# Patient Record
Sex: FEMALE | Race: WHITE | Employment: FULL TIME | URBAN - METROPOLITAN AREA
[De-identification: names, ages, dates, MRNs, and addresses within clinical notes are randomized per-mention and may not be internally consistent; named-entity substitution may affect disease eponyms.]

---

## 2017-02-10 PROBLEM — F41.9 ANXIETY: Status: ACTIVE | Noted: 2017-02-10

## 2017-02-10 PROBLEM — F60.5 OBSESSIVE COMPULSIVE PERSONALITY DISORDER (HCC): Status: ACTIVE | Noted: 2017-02-10

## 2017-07-10 PROBLEM — Z13.89 SCREENING FOR CARDIOVASCULAR, RESPIRATORY, AND GENITOURINARY DISEASES: Status: ACTIVE | Noted: 2017-07-10

## 2017-07-10 PROBLEM — Z13.6 SCREENING FOR CARDIOVASCULAR, RESPIRATORY, AND GENITOURINARY DISEASES: Status: ACTIVE | Noted: 2017-07-10

## 2017-07-10 PROBLEM — Z13.83 SCREENING FOR CARDIOVASCULAR, RESPIRATORY, AND GENITOURINARY DISEASES: Status: ACTIVE | Noted: 2017-07-10

## 2019-09-25 PROBLEM — Z13.89 SCREENING FOR CARDIOVASCULAR, RESPIRATORY, AND GENITOURINARY DISEASES: Status: RESOLVED | Noted: 2017-07-10 | Resolved: 2019-09-25

## 2019-09-25 PROBLEM — Z13.83 SCREENING FOR CARDIOVASCULAR, RESPIRATORY, AND GENITOURINARY DISEASES: Status: RESOLVED | Noted: 2017-07-10 | Resolved: 2019-09-25

## 2019-09-25 PROBLEM — Z13.6 SCREENING FOR CARDIOVASCULAR, RESPIRATORY, AND GENITOURINARY DISEASES: Status: RESOLVED | Noted: 2017-07-10 | Resolved: 2019-09-25

## 2021-10-01 ENCOUNTER — TRANSCRIBE ORDER (OUTPATIENT)
Dept: SCHEDULING | Age: 26
End: 2021-10-01

## 2021-10-01 DIAGNOSIS — N60.01 CYST OF RIGHT BREAST: Primary | ICD-10-CM

## 2022-03-18 PROBLEM — F41.9 ANXIETY: Status: ACTIVE | Noted: 2017-02-10

## 2022-03-19 PROBLEM — F60.5 OBSESSIVE COMPULSIVE PERSONALITY DISORDER (HCC): Status: ACTIVE | Noted: 2017-02-10

## 2022-07-19 ENCOUNTER — OFFICE VISIT (OUTPATIENT)
Dept: OCCUPATIONAL MEDICINE | Age: 27
End: 2022-07-19

## 2022-07-19 DIAGNOSIS — N39.0 URINARY TRACT INFECTION WITHOUT HEMATURIA, SITE UNSPECIFIED: Primary | ICD-10-CM

## 2022-07-19 DIAGNOSIS — R30.0 DYSURIA: ICD-10-CM

## 2022-07-19 PROCEDURE — 99213 OFFICE O/P EST LOW 20 MIN: CPT | Performed by: NURSE PRACTITIONER

## 2022-07-19 RX ORDER — FLUCONAZOLE 150 MG/1
TABLET ORAL
Qty: 2 TABLET | Refills: 0 | Status: SHIPPED | OUTPATIENT
Start: 2022-07-19 | End: 2022-09-19 | Stop reason: SDUPTHER

## 2022-07-19 RX ORDER — NITROFURANTOIN 25; 75 MG/1; MG/1
100 CAPSULE ORAL 2 TIMES DAILY
Qty: 14 CAPSULE | Refills: 0 | Status: SHIPPED | OUTPATIENT
Start: 2022-07-19 | End: 2022-09-19 | Stop reason: SDUPTHER

## 2022-07-21 ASSESSMENT — ENCOUNTER SYMPTOMS
NAUSEA: 0
CONSTIPATION: 0
COUGH: 0
ABDOMINAL PAIN: 0

## 2022-07-22 NOTE — PROGRESS NOTES
PROGRESS NOTE    SUBJECTIVE:   Kimberley Ralph is a 23 y.o. female seen for a follow up visit regarding Dysuria   The patient is a 23 y.o. female who is seen for evaluation of dysuria, frequency, urgency, abnormal smelling urine, foul smelling urine. She has had symptoms for several days. Patient also complains of back pain. Patient denies congestion, cough, fever, headache, rhinitis, sorethroat, stomach ache, and vaginal discharge. Patient does not have a history of recurrent UTI. Patient does not have a history of pyelonephritis. Past Medical History, Past Surgical History, Family history, Social History, and Medications were all reviewed with the patient today and updated as necessary. Current Outpatient Medications   Medication Sig Dispense Refill    nitrofurantoin, macrocrystal-monohydrate, (MACROBID) 100 MG capsule Take 1 capsule by mouth in the morning and 1 capsule before bedtime. 14 capsule 0    fluconazole (DIFLUCAN) 150 MG tablet Take one now, repeat in 72 hours 2 tablet 0    fluticasone (FLONASE) 50 MCG/ACT nasal spray 2 sprays by Nasal route daily      Methen-Hyosc-Meth Blue-Na Phos (UROGESIC-BLUE) 81.6 MG TABS Take QID PO PRN for dysuria x 2 days.  norethindrone-ethinyl estradiol (ORTHO-NOVUM 7/7/7) 0.5/0.75/1-35 MG-MCG per tablet Take 1 tablet by mouth daily       No current facility-administered medications for this visit. 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   Family History   Problem Relation Age of Onset    Lung Disease Maternal Grandfather     Diabetes Other         INSIPIDUS     Social History     Tobacco Use    Smoking status: Never    Smokeless tobacco: Never   Substance Use Topics    Alcohol use: Not Currently Alcohol/week: 4.0 standard drinks       Review of Systems   Constitutional:  Negative for fatigue and fever. Respiratory:  Negative for cough. Cardiovascular:  Negative for chest pain. Gastrointestinal:  Negative for abdominal pain, constipation and nausea. Genitourinary:  Positive for dysuria and frequency. Negative for flank pain, hematuria and urgency. Skin:  Negative for rash. OBJECTIVE:  There were no vitals taken for this visit. Physical Exam  Constitutional:       Appearance: Normal appearance. Cardiovascular:      Rate and Rhythm: Normal rate and regular rhythm. Pulmonary:      Effort: Pulmonary effort is normal.      Breath sounds: Normal breath sounds. Neurological:      Mental Status: She is alert. ASSESSMENT and PLAN    Ingrid Salter was seen today for dysuria. Diagnoses and all orders for this visit:    Urinary tract infection without hematuria, site unspecified    Dysuria    Other orders  -     nitrofurantoin, macrocrystal-monohydrate, (MACROBID) 100 MG capsule; Take 1 capsule by mouth in the morning and 1 capsule before bedtime.  -     fluconazole (DIFLUCAN) 150 MG tablet; Take one now, repeat in 72 hours      Take fluids   No improvement in 48 hours RTC. Take abx.

## 2022-09-19 ENCOUNTER — OFFICE VISIT (OUTPATIENT)
Dept: OCCUPATIONAL MEDICINE | Age: 27
End: 2022-09-19

## 2022-09-19 DIAGNOSIS — R30.0 DYSURIA: Primary | ICD-10-CM

## 2022-09-19 RX ORDER — NITROFURANTOIN 25; 75 MG/1; MG/1
100 CAPSULE ORAL 2 TIMES DAILY
Qty: 14 CAPSULE | Refills: 0 | Status: SHIPPED | OUTPATIENT
Start: 2022-09-19 | End: 2022-10-11 | Stop reason: ALTCHOICE

## 2022-09-19 RX ORDER — FLUCONAZOLE 150 MG/1
TABLET ORAL
Qty: 2 TABLET | Refills: 0 | Status: SHIPPED | OUTPATIENT
Start: 2022-09-19

## 2022-09-23 ASSESSMENT — ENCOUNTER SYMPTOMS
ABDOMINAL PAIN: 0
SHORTNESS OF BREATH: 0

## 2022-09-23 NOTE — PROGRESS NOTES
PROGRESS NOTE    SUBJECTIVE:   Kimberley Coley is a 23 y.o. female seen for a follow up visit regarding Urinary Tract Infection   The patient is a 23 y.o. female who is seen for evaluation of dysuria, frequency, urgency. She has had symptoms for several days. Patient also complains of back pain. Patient denies congestion, cough, fever, headache, rhinitis, sorethroat, stomach ache, and vaginal discharge. Patient does have a history of recurrent UTI. Patient does have a history of pyelonephritis. Past Medical History, Past Surgical History, Family history, Social History, and Medications were all reviewed with the patient today and updated as necessary. Current Outpatient Medications   Medication Sig Dispense Refill    nitrofurantoin, macrocrystal-monohydrate, (MACROBID) 100 MG capsule Take 1 capsule by mouth 2 times daily 14 capsule 0    fluconazole (DIFLUCAN) 150 MG tablet Take one now, repeat in 72 hours 2 tablet 0    fluticasone (FLONASE) 50 MCG/ACT nasal spray 2 sprays by Nasal route daily      Methen-Hyosc-Meth Blue-Na Phos (UROGESIC-BLUE) 81.6 MG TABS Take QID PO PRN for dysuria x 2 days. norethindrone-ethinyl estradiol (ORTHO-NOVUM 7/7/7) 0.5/0.75/1-35 MG-MCG per tablet Take 1 tablet by mouth daily       No current facility-administered medications for this visit. 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   Family History   Problem Relation Age of Onset    Lung Disease Maternal Grandfather     Diabetes Other         INSIPIDUS     Social History     Tobacco Use    Smoking status: Never    Smokeless tobacco: Never   Substance Use Topics    Alcohol use: Not Currently     Alcohol/week: 4.0 standard drinks       Review of Systems   Eyes:  Negative for visual disturbance. Respiratory:  Negative for shortness of breath. Cardiovascular:  Negative for chest pain, palpitations and leg swelling. Gastrointestinal:  Negative for abdominal pain. Neurological:  Negative for dizziness, syncope and light-headedness. OBJECTIVE:  There were no vitals taken for this visit. Physical Exam  Constitutional:       Appearance: Normal appearance. Cardiovascular:      Rate and Rhythm: Normal rate and regular rhythm. Pulmonary:      Effort: Pulmonary effort is normal.      Breath sounds: Normal breath sounds. Neurological:      Mental Status: She is alert. ASSESSMENT and PLAN    Juaquin Young was seen today for urinary tract infection. Diagnoses and all orders for this visit:    Dysuria  -     Culture, Urine; Future    Other orders  -     nitrofurantoin, macrocrystal-monohydrate, (MACROBID) 100 MG capsule; Take 1 capsule by mouth 2 times daily  -     fluconazole (DIFLUCAN) 150 MG tablet; Take one now, repeat in 72 hours        Increases in fluids   Take meds   Reviewed triggers, prevention and hygiene. Changing treatment due to cultural-- patient is aware.

## 2022-10-03 RX ORDER — LEVOFLOXACIN 750 MG/1
750 TABLET ORAL DAILY
Qty: 5 TABLET | Refills: 0 | Status: SHIPPED | OUTPATIENT
Start: 2022-10-03 | End: 2022-10-08

## 2022-10-11 ENCOUNTER — OFFICE VISIT (OUTPATIENT)
Dept: OCCUPATIONAL MEDICINE | Age: 27
End: 2022-10-11

## 2022-10-11 DIAGNOSIS — R30.0 DYSURIA: Primary | ICD-10-CM

## 2022-10-11 RX ORDER — METHENAMINE, SODIUM PHOSPHATE, MONOBASIC, METHYLENE BLUE, AND HYOSCYAMINE SULFATE 81.6; 40.8; 10.8; .12 MG/1; MG/1; MG/1; MG/1
TABLET, COATED ORAL
Qty: 30 TABLET | Refills: 0 | Status: SHIPPED | OUTPATIENT
Start: 2022-10-11 | End: 2022-10-11 | Stop reason: SDUPTHER

## 2022-10-11 RX ORDER — METHENAMINE, SODIUM PHOSPHATE, MONOBASIC, METHYLENE BLUE, AND HYOSCYAMINE SULFATE 81.6; 40.8; 10.8; .12 MG/1; MG/1; MG/1; MG/1
TABLET, COATED ORAL
Qty: 30 TABLET | Refills: 0 | Status: SHIPPED | OUTPATIENT
Start: 2022-10-11

## 2022-10-22 ASSESSMENT — ENCOUNTER SYMPTOMS
COUGH: 0
ABDOMINAL PAIN: 0
NAUSEA: 0
CONSTIPATION: 0

## 2022-10-22 NOTE — PROGRESS NOTES
PROGRESS NOTE    SUBJECTIVE:   Kimberley Mark is a 23 y.o. female seen for a follow up visit regarding Dysuria   Pt reports that she did take her levquin and felt well. But reports she is having more issues on and off of dysuria. Reports that she would like a referral to urology. Reports currently stable after using AZO. Past Medical History, Past Surgical History, Family history, Social History, and Medications were all reviewed with the patient today and updated as necessary. Current Outpatient Medications   Medication Sig Dispense Refill    Methen-Hyosc-Meth Blue-Na Phos (UROGESIC-BLUE) 81.6 MG TABS Take one tablet QID PO PRN 30 tablet 0    fluconazole (DIFLUCAN) 150 MG tablet Take one now, repeat in 72 hours 2 tablet 0    fluticasone (FLONASE) 50 MCG/ACT nasal spray 2 sprays by Nasal route daily      norethindrone-ethinyl estradiol (ORTHO-NOVUM 7/7/7) 0.5/0.75/1-35 MG-MCG per tablet Take 1 tablet by mouth daily       No current facility-administered medications for this visit. 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 Family History   Problem Relation Age of Onset    Lung Disease Maternal Grandfather     Diabetes Other         INSIPIDUS     Social History     Tobacco Use    Smoking status: Never    Smokeless tobacco: Never   Substance Use Topics    Alcohol use: Not Currently     Alcohol/week: 4.0 standard drinks       Review of Systems   Constitutional:  Negative for fatigue and fever. Respiratory:  Negative for cough. Cardiovascular:  Negative for chest pain. Gastrointestinal:  Negative for abdominal pain, constipation and nausea. Genitourinary:  Positive for dysuria and frequency. Negative for flank pain, hematuria and urgency.    Skin:  Negative for rash. OBJECTIVE:  There were no vitals taken for this visit. Physical Exam  Constitutional:       Appearance: Normal appearance. Cardiovascular:      Rate and Rhythm: Normal rate and regular rhythm. Pulmonary:      Effort: Pulmonary effort is normal.      Breath sounds: Normal breath sounds. Neurological:      Mental Status: She is alert. ASSESSMENT and PLAN    Florecita Griffith was seen today for dysuria. Diagnoses and all orders for this visit:    Dysuria    Other orders  -     Discontinue: Methen-Hyosc-Meth Blue-Na Phos (UROGESIC-BLUE) 81.6 MG TABS; Take one tablet QID PO PRN  -     Methen-Hyosc-Meth Blue-Na Phos (UROGESIC-BLUE) 81.6 MG TABS; Take one tablet QID PO PRN      Will refer  Given some Urogesic kaveh to help with symptoms  Will get urine cultural on Thursday.

## 2024-01-31 ENCOUNTER — HOSPITAL ENCOUNTER (OUTPATIENT)
Dept: MAMMOGRAPHY | Age: 29
Discharge: HOME | End: 2024-02-03
Attending: OBSTETRICS & GYNECOLOGY
Payer: COMMERCIAL

## 2024-01-31 DIAGNOSIS — N60.02 SOLITARY CYST OF LEFT BREAST: ICD-10-CM

## 2024-01-31 PROCEDURE — 76642 ULTRASOUND BREAST LIMITED: CPT
